# Patient Record
Sex: FEMALE | URBAN - METROPOLITAN AREA
[De-identification: names, ages, dates, MRNs, and addresses within clinical notes are randomized per-mention and may not be internally consistent; named-entity substitution may affect disease eponyms.]

---

## 2024-11-13 ENCOUNTER — APPOINTMENT (OUTPATIENT)
Dept: URBAN - METROPOLITAN AREA CLINIC 305 | Age: 34
Setting detail: DERMATOLOGY
End: 2024-11-26

## 2024-11-13 DIAGNOSIS — L91.8 OTHER HYPERTROPHIC DISORDERS OF THE SKIN: ICD-10-CM

## 2024-11-13 DIAGNOSIS — L85.3 XEROSIS CUTIS: ICD-10-CM

## 2024-11-13 DIAGNOSIS — L21.8 OTHER SEBORRHEIC DERMATITIS: ICD-10-CM

## 2024-11-13 PROCEDURE — 99203 OFFICE O/P NEW LOW 30 MIN: CPT

## 2024-11-13 PROCEDURE — OTHER COUNSELING: OTHER

## 2024-11-13 PROCEDURE — OTHER PRESCRIPTION: OTHER

## 2024-11-13 PROCEDURE — OTHER MIPS QUALITY: OTHER

## 2024-11-13 RX ORDER — KETOCONAZOLE 20 MG/ML
SHAMPOO, SUSPENSION TOPICAL BIW
Qty: 120 | Refills: 11 | Status: ERX | COMMUNITY
Start: 2024-11-13

## 2024-11-13 RX ORDER — MOMETASONE FUROATE 1 MG/G
CREAM TOPICAL
Qty: 45 | Refills: 3 | Status: ERX | COMMUNITY
Start: 2024-11-13

## 2024-11-13 RX ORDER — FLUOCINONIDE 0.5 MG/ML
SOLUTION TOPICAL
Qty: 60 | Refills: 6 | Status: ERX | COMMUNITY
Start: 2024-11-13

## 2024-11-13 ASSESSMENT — LOCATION SIMPLE DESCRIPTION DERM
LOCATION SIMPLE: SCALP
LOCATION SIMPLE: RIGHT CALF
LOCATION SIMPLE: RIGHT CHEEK
LOCATION SIMPLE: LEFT CHEEK

## 2024-11-13 ASSESSMENT — LOCATION DETAILED DESCRIPTION DERM
LOCATION DETAILED: RIGHT SUPERIOR PARIETAL SCALP
LOCATION DETAILED: RIGHT DISTAL CALF
LOCATION DETAILED: RIGHT MEDIAL MALAR CHEEK
LOCATION DETAILED: LEFT INFERIOR MEDIAL MALAR CHEEK

## 2024-11-13 ASSESSMENT — LOCATION ZONE DERM
LOCATION ZONE: SCALP
LOCATION ZONE: LEG
LOCATION ZONE: FACE

## 2024-11-13 NOTE — PROCEDURE: COUNSELING
Detail Level: Detailed
Patient Specific Counseling (Will Not Stick From Patient To Patient): LN2 to 2 lesions to left neck-no charge